# Patient Record
Sex: MALE | Race: BLACK OR AFRICAN AMERICAN | NOT HISPANIC OR LATINO | Employment: FULL TIME | URBAN - METROPOLITAN AREA
[De-identification: names, ages, dates, MRNs, and addresses within clinical notes are randomized per-mention and may not be internally consistent; named-entity substitution may affect disease eponyms.]

---

## 2022-07-22 ENCOUNTER — OCCUPATIONAL HEALTH (OUTPATIENT)
Dept: URGENT CARE | Facility: CLINIC | Age: 57
End: 2022-07-22

## 2022-07-22 DIAGNOSIS — Z00.00 ENCOUNTER FOR PHYSICAL EXAMINATION: ICD-10-CM

## 2022-07-22 DIAGNOSIS — F17.200 NEEDS SMOKING CESSATION EDUCATION: ICD-10-CM

## 2022-07-22 DIAGNOSIS — Z02.83 ENCOUNTER FOR DRUG SCREENING: ICD-10-CM

## 2022-07-22 LAB
CTP QC/QA: YES
POC 5 PANEL DRUG SCREEN: NEGATIVE

## 2022-07-22 PROCEDURE — 94010 PULMONARY FUNCTION SCREENING (OCC MED PHYSICALS): ICD-10-PCS | Mod: S$GLB,,, | Performed by: EMERGENCY MEDICINE

## 2022-07-22 PROCEDURE — 99080 OSHA QUESTIONNAIRE: ICD-10-PCS | Mod: S$GLB,,, | Performed by: EMERGENCY MEDICINE

## 2022-07-22 PROCEDURE — 80305 POCT RAPID DRUG SCREEN 5 PANEL: ICD-10-PCS | Mod: S$GLB,,, | Performed by: EMERGENCY MEDICINE

## 2022-07-22 PROCEDURE — 99499 UNLISTED E&M SERVICE: CPT | Mod: S$GLB,,, | Performed by: EMERGENCY MEDICINE

## 2022-07-22 PROCEDURE — 99002 MASK FIT-QUALITATIVE: ICD-10-PCS | Mod: S$GLB,,, | Performed by: EMERGENCY MEDICINE

## 2022-07-22 PROCEDURE — 92552 PURE TONE AUDIOMETRY AIR: CPT | Mod: S$GLB,,, | Performed by: EMERGENCY MEDICINE

## 2022-07-22 PROCEDURE — 94010 BREATHING CAPACITY TEST: CPT | Mod: S$GLB,,, | Performed by: EMERGENCY MEDICINE

## 2022-07-22 PROCEDURE — 99002 DEVICE HANDLING PHYS/QHP: CPT | Mod: S$GLB,,, | Performed by: EMERGENCY MEDICINE

## 2022-07-22 PROCEDURE — 99499 PHYSICAL, BASIC COMPLEXITY: ICD-10-PCS | Mod: S$GLB,,, | Performed by: EMERGENCY MEDICINE

## 2022-07-22 PROCEDURE — 99080 SPECIAL REPORTS OR FORMS: CPT | Mod: S$GLB,,, | Performed by: EMERGENCY MEDICINE

## 2022-07-22 PROCEDURE — 71045 X-RAY EXAM CHEST 1 VIEW: CPT | Mod: TIER,S$GLB,, | Performed by: RADIOLOGY

## 2022-07-22 PROCEDURE — 92552 AUDIOGRAM OCC MED: ICD-10-PCS | Mod: S$GLB,,, | Performed by: EMERGENCY MEDICINE

## 2022-07-22 PROCEDURE — 80305 DRUG TEST PRSMV DIR OPT OBS: CPT | Mod: S$GLB,,, | Performed by: EMERGENCY MEDICINE

## 2022-07-22 PROCEDURE — 71045 XR CHEST 1 VIEW: ICD-10-PCS | Mod: TIER,S$GLB,, | Performed by: RADIOLOGY

## 2022-07-22 NOTE — PROGRESS NOTES
[facility]    X-Ray Chest 1 View    Result Date: 7/22/2022  EXAMINATION: XR CHEST 1 VIEW CLINICAL HISTORY: Encounter for general adult medical examination without abnormal findings TECHNIQUE: Single frontal view of the chest was performed. COMPARISON: None FINDINGS: Heart size pulmonary vessels are normal.  Lungs are well aerated and clear.  No confluent consolidation.     No significant cardiopulmonary abnormality identified. Electronically signed by: Giovany Jenkins MD Date:    07/22/2022 Time:    11:43

## 2022-08-17 ENCOUNTER — TELEPHONE (OUTPATIENT)
Dept: SMOKING CESSATION | Facility: CLINIC | Age: 57
End: 2022-08-17
Payer: COMMERCIAL

## 2022-08-17 NOTE — TELEPHONE ENCOUNTER
Smoking Cessation counselor contacted patient regarding scheduled 4:00 P.M. visit, but patient has not arrived at clinic, nor did he answer the phone.  Counselor left a message with rescheduling information.      Jesica Choi RRT, MSW, LMSW, TTS  [phone]

## 2022-08-30 ENCOUNTER — TELEPHONE (OUTPATIENT)
Dept: SMOKING CESSATION | Facility: CLINIC | Age: 57
End: 2022-08-30
Payer: COMMERCIAL

## 2022-08-30 NOTE — TELEPHONE ENCOUNTER
Smoking Cessation counselor attempted to contact patient regarding no show appointment for intake visit, but patient did not answer.  Counselor left a message with rescheduling information.     Jesica Choi RRT,MSW,LMSW,TTS  [phone]

## 2022-09-02 ENCOUNTER — TELEPHONE (OUTPATIENT)
Dept: SMOKING CESSATION | Facility: CLINIC | Age: 57
End: 2022-09-02
Payer: COMMERCIAL

## 2023-04-16 ENCOUNTER — HOSPITAL ENCOUNTER (EMERGENCY)
Facility: HOSPITAL | Age: 58
Discharge: HOME | End: 2023-04-16
Attending: EMERGENCY MEDICINE

## 2023-04-16 VITALS
OXYGEN SATURATION: 99 % | HEART RATE: 92 BPM | SYSTOLIC BLOOD PRESSURE: 139 MMHG | HEIGHT: 73 IN | DIASTOLIC BLOOD PRESSURE: 97 MMHG | BODY MASS INDEX: 22.53 KG/M2 | WEIGHT: 170 LBS | RESPIRATION RATE: 15 BRPM | TEMPERATURE: 99 F

## 2023-04-16 DIAGNOSIS — M79.604 ACUTE PAIN OF RIGHT LOWER EXTREMITY: Primary | ICD-10-CM

## 2023-04-16 DIAGNOSIS — R20.2 PARESTHESIA OF RIGHT LOWER EXTREMITY: ICD-10-CM

## 2023-04-16 PROCEDURE — 25000003 PHARM REV CODE 250: Performed by: PHYSICIAN ASSISTANT

## 2023-04-16 PROCEDURE — 99283 EMERGENCY DEPT VISIT LOW MDM: CPT

## 2023-04-16 RX ORDER — DICLOFENAC SODIUM 10 MG/G
2 GEL TOPICAL 3 TIMES DAILY
Qty: 100 G | Refills: 0 | Status: SHIPPED | OUTPATIENT
Start: 2023-04-16 | End: 2023-04-26

## 2023-04-16 RX ORDER — IBUPROFEN 600 MG/1
600 TABLET ORAL
Status: COMPLETED | OUTPATIENT
Start: 2023-04-16 | End: 2023-04-16

## 2023-04-16 RX ORDER — MELOXICAM 7.5 MG/1
7.5 TABLET ORAL DAILY
Qty: 12 TABLET | Refills: 0 | Status: SHIPPED | OUTPATIENT
Start: 2023-04-16

## 2023-04-16 RX ADMIN — IBUPROFEN 600 MG: 600 TABLET ORAL at 11:04

## 2023-04-16 NOTE — DISCHARGE INSTRUCTIONS
Thank you for coming to our Emergency Department today. It is important to remember that some problems or medical conditions are difficult to diagnose and may not be found or addressed during your Emergency Department visit.  These conditions often start with non-specific symptoms and can only be diagnosed on follow up visits with your primary care physician or specialist when the symptoms continue or change. Please remember that all medical conditions can change, and we cannot predict how you will be feeling tomorrow or the next day. Return to the ER with any questions/concerns, new/concerning symptoms, worsening or failure to improve.       Be sure to follow up with your primary care doctor and review all labs/imaging/tests that were performed during your ER visit with them. It is very common for us to identify non-emergent incidental findings which must be followed up with your primary care physician.  Some labs/imaging/tests may be outside of the normal range, and require non-emergent follow-up and/or further investigation/treatment/procedures/testing to help diagnose/exclude/prevent complications or other potentially serious medical conditions. Some abnormalities may not have been discussed or addressed during your ER visit.     An ER visit does not replace a primary care visit, and many screening tests or follow-up tests cannot be ordered by an ER doctor or performed by the ER. Some tests may even require pre-approval.    If you do not have a primary care doctor, you may contact the one listed on your discharge paperwork or you may also call the [facility] Appointment Desk at [phone] , or [address] [place] [address] at  [phone] to schedule an appointment, or establish care with a primary care doctor or even a specialist and to obtain information about local resources. It is important to your health that you have a primary care doctor.    Please take all medications as directed. We have done our best to select  a medication for you that will treat your condition however, all medications may potentially have side-effects and it is impossible to predict which medications may give you side-effects or what those side-effects (if any) those medications may give you.  If you feel that you are having a negative effect or side-effect of any medication you should stop taking those medications immediately and seek medical attention. If you feel that you are having a life-threatening reaction call 911.        Do not drive, swim, climb to height, take a bath, operate heavy machinery, drink alcohol or take potentially sedating medications, sign any legal documents or make any important decisions for 24 hours if you have received any pain medications, sedatives or mood altering drugs during your ER visit or within 24 hours of taking them if they have been prescribed to you.     You can find additional resources for Dentists, hearing aids, durable medical equipment, low cost pharmacies and other resources at [email or web address]

## 2023-04-16 NOTE — Clinical Note
"Aquiles"Brice Hassan was seen and treated in our emergency department on 4/16/2023.  He may return to work on 04/19/2023.       If you have any questions or concerns, please don't hesitate to call.      Mariano Cotton PA-C"

## 2023-04-16 NOTE — ED PROVIDER NOTES
"Encounter Date: 4/16/2023    SCRIBE #1 NOTE: I, CAPRICE Abrams, am scribing for, and in the presence of,  Mariano Cotton PA-C. I have scribed the following portions of the note - Other sections scribed: HPI, ROS.     History     Chief Complaint   Patient presents with    Leg Pain     Pt to ED with complaints of R leg pain since January. Denies trauma to the leg. Pt states "Im 57 I think my age is catching up to me." Ambulatory in triage.     Aquiles Hassan is a 57 y.o. male, with no pertinent PMHx, who presents to the ED with right leg pain localized to right side between shin and ankle which began ~3 months ago. Pt also complains of intermittent numbness to right great toe. Pt reports he stands/walks ~10 hours for work regularly. Pt attempted treatment with Excedrin Extra Strength to temporary relief. No other exacerbating or alleviating factors. Denies leg swelling, knee pain, back pain, or other associated symptoms. Pt reports he had back surgery in 2000 but is unsure if it is related to recent leg pain.    The history is provided by the patient. No  was used.   Review of patient's allergies indicates:  No Known Allergies  History reviewed. No pertinent past medical history.  History reviewed. No pertinent surgical history.  History reviewed. No pertinent family history.  Social History     Tobacco Use    Smoking status: Every Day     Packs/day: 1.00     Types: Cigarettes    Smokeless tobacco: Never   Substance Use Topics    Alcohol use: Yes     Alcohol/week: 2.0 standard drinks     Types: 2 Cans of beer per week     Review of Systems   Constitutional:  Negative for fever.   HENT:  Negative for congestion, sore throat and trouble swallowing.    Respiratory:  Negative for cough and shortness of breath.    Cardiovascular:  Negative for chest pain and leg swelling.   Gastrointestinal:  Negative for abdominal pain, constipation, diarrhea, nausea and vomiting.   Genitourinary:  Negative for " dysuria, flank pain, frequency and urgency.   Musculoskeletal:  Positive for arthralgias and myalgias. Negative for back pain and joint swelling.   Skin:  Negative for rash.   Neurological:  Positive for numbness. Negative for headaches.   All other systems reviewed and are negative.    Physical Exam     Initial Vitals [04/16/23 1104]   BP Pulse Resp Temp SpO2   (!) 139/97 92 15 98.5 °F (36.9 °C) 99 %      MAP       --         Physical Exam    Nursing note and vitals reviewed.  Constitutional: He appears well-developed and well-nourished. He is not diaphoretic. No distress.   HENT:   Head: Atraumatic.   Right Ear: External ear normal.   Left Ear: External ear normal.   Mouth/Throat: Oropharynx is clear and moist. No oropharyngeal exudate.   Eyes: Conjunctivae are normal.   Neck: No tracheal deviation present.   Normal range of motion.  Cardiovascular:  Normal rate and regular rhythm.           Pulmonary/Chest: Effort normal. No accessory muscle usage or stridor. No tachypnea. No respiratory distress.   Musculoskeletal:         General: No tenderness or edema. Normal range of motion.      Cervical back: Normal range of motion.     Neurological: He displays no tremor. He displays no seizure activity. Coordination and gait normal.   Skin: Skin is warm and intact. No cyanosis.       ED Course   Procedures  Labs Reviewed - No data to display       Imaging Results              X-Ray Tibia Fibula 2 View Right (Final result)  Result time 04/16/23 12:02:14      Final result by Emily Eid MD (04/16/23 12:02:14)                   Impression:      No fracture.      Electronically signed by: Emily Eid  Date:    04/16/2023  Time:    12:02               Narrative:    EXAMINATION:  XR TIBIA FIBULA 2 VIEW RIGHT    CLINICAL HISTORY:  Pain in right leg    TECHNIQUE:  AP and lateral views of the right tibia and fibula were performed.    COMPARISON:  None.    FINDINGS:  No fracture.  No soft tissue abnormality.  The knee  and ankle joints, though incompletely evaluated, appear unremarkable.                                       Medications   ibuprofen tablet 600 mg (600 mg Oral Given 4/16/23 1124)     Medical Decision Making:   History:   Old Medical Records: I decided to obtain old medical records.  ED Management:  Chronic atraumatic pain to the distal right lower lateral extremity.  Screening x-ray shows no pathologic fracture or bony neoplastic process.  Perhaps history of lumbar surgery could be contributing to his symptoms.  He does have chronic paresthesia over the L5 dermatome.  He does appear to have some involvement of the S4 dermatome as well.  No foot drop or motor deficit.  No signs of DVT.  Ambulatory.        Scribe Attestation:   Scribe #1: I performed the above scribed service and the documentation accurately describes the services I performed. I attest to the accuracy of the note.                 I, Mariano Cotton PA-C, personally performed the services described in this documentation.  All medical record entries made by the scribe were at my direction and in my presence.  I have reviewed the chart and agree that the record reflects my personal performance and is accurate and complete.  Clinical Impression:   Final diagnoses:  [M79.604] Acute pain of right lower extremity (Primary)  [R20.2] Paresthesia of right lower extremity        ED Disposition Condition    Discharge Stable          ED Prescriptions       Medication Sig Dispense Start Date End Date Auth. Provider    meloxicam (MOBIC) 7.5 MG tablet Take 1 tablet (7.5 mg total) by mouth once daily. 12 tablet 4/16/2023 -- Mariano Cotton PA-C    diclofenac sodium (VOLTAREN) 1 % Gel Apply 2 g topically 3 (three) times daily. for 10 days 100 g 4/16/2023 4/26/2023 Mariano Cotton PA-C          Follow-up Information       Follow up With Specialties Details Why Contact Info    [facility]  Schedule an appointment as soon as possible for a visit in 1  day For reevaluation, AND to establish primary if you don't have a  [address]  [facility] [address]  [phone]      [facility] Orthopedics Schedule an appointment as soon as possible for a visit in 1 day For further evaluation of your orthopedic injury [address]  [facility] [address]  [phone]    [facility] [place] [facility] Emergency Medicine Go to  If symptoms worsen [address] [facility] [id number] [phone]             Mariano Cotton PA-C  04/16/23 [id number]

## 2023-06-02 ENCOUNTER — OCCUPATIONAL HEALTH (OUTPATIENT)
Dept: URGENT CARE | Facility: CLINIC | Age: 58
End: 2023-06-02

## 2023-06-02 DIAGNOSIS — Z02.1 PRE-EMPLOYMENT EXAMINATION: Primary | ICD-10-CM

## 2023-06-02 DIAGNOSIS — Z13.9 ENCOUNTER FOR SCREENING: Primary | ICD-10-CM

## 2023-06-02 DIAGNOSIS — F17.200 NEEDS SMOKING CESSATION EDUCATION: ICD-10-CM

## 2023-06-02 LAB
BREATH ALCOHOL: 0
CTP QC/QA: YES
POC 10 PANEL DRUG SCREEN: NEGATIVE

## 2023-06-02 PROCEDURE — 80305 OOH COLLECTION ONLY DRUG SCREEN: ICD-10-PCS | Mod: S$GLB,,, | Performed by: NURSE PRACTITIONER

## 2023-06-02 PROCEDURE — 82075 POCT BREATH ALCOHOL TEST: ICD-10-PCS | Mod: S$GLB,,, | Performed by: NURSE PRACTITIONER

## 2023-06-02 PROCEDURE — 99002 MASK FIT-QUALITATIVE: ICD-10-PCS | Mod: S$GLB,,, | Performed by: NURSE PRACTITIONER

## 2023-06-02 PROCEDURE — 99002 DEVICE HANDLING PHYS/QHP: CPT | Mod: S$GLB,,, | Performed by: NURSE PRACTITIONER

## 2023-06-02 PROCEDURE — 99499 PHYSICAL, BASIC COMPLEXITY: ICD-10-PCS | Mod: S$GLB,,, | Performed by: NURSE PRACTITIONER

## 2023-06-02 PROCEDURE — 80305 DRUG TEST PRSMV DIR OPT OBS: CPT | Mod: S$GLB,,, | Performed by: NURSE PRACTITIONER

## 2023-06-02 PROCEDURE — 94010 PULMONARY FUNCTION SCREENING (OCC MED PHYSICALS): ICD-10-PCS | Mod: S$GLB,,, | Performed by: NURSE PRACTITIONER

## 2023-06-02 PROCEDURE — 82075 ASSAY OF BREATH ETHANOL: CPT | Mod: S$GLB,,, | Performed by: NURSE PRACTITIONER

## 2023-06-02 PROCEDURE — 99080 OSHA QUESTIONNAIRE: ICD-10-PCS | Mod: S$GLB,,, | Performed by: NURSE PRACTITIONER

## 2023-06-02 PROCEDURE — 94010 BREATHING CAPACITY TEST: CPT | Mod: S$GLB,,, | Performed by: NURSE PRACTITIONER

## 2023-06-02 PROCEDURE — 99499 UNLISTED E&M SERVICE: CPT | Mod: S$GLB,,, | Performed by: NURSE PRACTITIONER

## 2023-06-02 PROCEDURE — 80305 POCT RAPID DRUG SCREEN 10 PANEL: ICD-10-PCS | Mod: S$GLB,,, | Performed by: NURSE PRACTITIONER

## 2023-06-02 PROCEDURE — 99080 SPECIAL REPORTS OR FORMS: CPT | Mod: S$GLB,,, | Performed by: NURSE PRACTITIONER

## 2023-06-14 ENCOUNTER — CLINICAL SUPPORT (OUTPATIENT)
Dept: SMOKING CESSATION | Facility: CLINIC | Age: 58
End: 2023-06-14

## 2023-06-14 DIAGNOSIS — F17.200 NICOTINE DEPENDENCE: Primary | ICD-10-CM

## 2023-06-14 PROCEDURE — 99407 BEHAV CHNG SMOKING > 10 MIN: CPT | Mod: S$GLB,,,

## 2023-06-14 PROCEDURE — 99999 PR PBB SHADOW E&M-EST. PATIENT-LVL I: ICD-10-PCS | Mod: PBBFAC,,,

## 2023-06-14 PROCEDURE — 99407 PR TOBACCO USE CESSATION INTENSIVE >10 MINUTES: ICD-10-PCS | Mod: S$GLB,,,

## 2023-06-14 PROCEDURE — 99999 PR PBB SHADOW E&M-EST. PATIENT-LVL I: CPT | Mod: PBBFAC,,,

## 2025-01-27 NOTE — TELEPHONE ENCOUNTER
January 27, 2025  ENA Tijerina  [address]  [address] [place]  [facility] [place] [address]    Dear PIPER Zayas,    The attached plan of care is being sent to you for review and reference.    You may indicate your approval by signing the document electronically, or by faxing/mailing a signed copy of the final page of this document back to the attention of Manjit Nielsen, PT:         Plan of Care 1/27/25   Effective from: 1/27/2025  Effective to: 2/28/2025    Plan ID: [id number]            Participants as of Finalize on 1/27/2025    Name Type Comments Contact Info    ENA Tijerina Referring Provider  [phone]    Manjit Nielsen, PT Physical Therapist         Last Plan Note     Author: Manjit Nielsen PT Status: Incomplete Last edited: 1/27/2025 10:15 AM         Outpatient Rehab    Physical Therapy Evaluation (only)    Patient Name: PIPER Zayas  MRN: [id number]  YOB: 1974  Today's Date: 1/27/2025    Therapy Diagnosis:   Encounter Diagnoses   Name Primary?    Lumbosacral spondylosis without myelopathy     Weakness of both lower extremities Yes    Impaired functional mobility, balance, gait, and endurance      Physician: Page Ahuja*    Physician Orders: Eval and Treat  Medical Diagnosis: M47.817 (ICD-10-CM) - Lumbosacral spondylosis without myelopathy    Visit # / Visits Authorized:  1 / Only initial evaluation approved by [employer]. Subsequent visits require prior authorization.     Date of Evaluation:  1/27/2025   Insurance Authorization Period: initial evaluation approved by phan  Plan of Care Certification:  1/27/2025 to 2/28/24      Time In: 1039   Time Out: 1107  Total Time: 28   Total Billable Time: 28         Subjective  History of Present Illness  PIPER is a 51 y.o. female who reports to physical therapy with a chief concern of Lumbosacral Spondylosis. According to the patient's chart, [email or web address]  Smoking Cessation counselor attempted to contact patient regarding no show appointment for intake visit, but patient did not answer.  Counselor left a message with rescheduling information.      Jesica Choi RRT,MSW,LMSW,TTS  [phone]   has no past surgical history on file.    The patient reports a medical diagnosis of M47.817 (ICD-10-CM) - Lumbosacral spondylosis without myelopathy. The patient has experienced this issue since 01/27/25.   Diagnostic tests related to this condition: X-ray.   X-Ray Details: Findings:  The vertebral body heights, alignment, disc spaces are preserved. Mild diffuse facet degeneration throughout the lumbar spine with sclerosis and osteophyte formation. Received From: [facility]    Dominant Hand: Right  History of Present Condition/Illness: Patient states her back has been hurting about 3 months ago. She states she just started hurting in her back one day and couldn't get out of bed about 3 days. She then went to the ER and got a shot and then she could move a little bit. She states that's when they took the imaging of her back. She states she has also had problems with her knees and feet in the past. She did therapy on the knees and states it helped, but her knees are just worn out. She states when she fell and broke both feet this probably did hurt the back some but she didn't pay that any attention because her feet were hurt so bad.     Activities of Daily Living  Social history was obtained from Patient.    General Prior Level of Function Comments: ind  General Current Level of Function Comments: ind       Previously independent with activities of daily living? Yes     Currently independent with activities of daily living? Yes              Pain     Patient reports a current pain level of 7/10. Pain at best is reported as 5/10. Pain at worst is reported as 9/10.   Location: middle lower back all the way down to the feet.  Clinical Progression (since onset): Worsening  Pain Qualities: Aching, Dull  Pain-Relieving Factors: Relaxation, Medications - prescription  Pain-Aggravating Factors: Standing, Walking, Holding objects, Bending         Review of Systems  Patient reports: Cardiac History and  Osteoarthritis        Living Arrangements  Living Situation  Housing: Home independently  Living Arrangements: Alone  Support Systems: Children    Home Setup  Type of Structure: House  Home Access: Level entry  Number of Levels in Home: One level  Patient is able to live on main floor of home: Yes        Employment  Patient reports: Does the patient's condition impact their ability to work?  Employment Status: Not employed          Past Medical History/Physical Systems Review:   Sherin Zayas  has a past medical history of Knee pain and Mixed hyperlipidemia.    Sherin Zayas  has no past surgical history on file.    Sherin has a current medication list which includes the following prescription(s): atorvastatin, fluoxetine, hydrocodone-acetaminophen, and hydroxychloroquine.    Review of patient's allergies indicates:  No Known Allergies     Objective  Posture  Patient presents with a Forward head position. Increased lumbar lordosis is observed.   Shoulders are Rounded. Pelvic tilt observed: Anterior              Knee Observations  Right Knee Observations  Not Present: Straight Leg Raise Extensor Lag  Left Knee Observations  Not Present: Straight Leg Raise Extensor Lag            Lower Extremity Sensation  General Lumbar/Lower Extremity Sensation  Intact: Right and Left  Right Lumbar/Lower Extremity Sensation  Intact: Light Touch, Sharp/Dull Discrimination, Static Two Point Discrimination, Dynamic Two Point Discrimination, Kinesthesia, and Proprioception  Right Lumbar/Lower Extremity Sensation Stocking Glove Pattern: No    Left Lumbar/Lower Extremity Sensation  Intact: Light Touch, Static Two Point Discrimination, Dynamic Two Point Discrimination, Sharp/Dull Discrimination, Kinesthesia, and Proprioception  Left Lumbar/Lower Extremity Sensation Stocking Glove Pattern: No             Right Lower Extremity Reflexes  Patellar, L4: Normal (2+)                   Left Lower Extremity Reflexes  Patellar, L4: Normal (2+)                         Spinal Mobility  Hypomobile: Lumbosacral       Spinal Muscle Palpation  Right Spinal Muscle Palpation  Abnormal: Lumbar/Sacral  Right Lumbar/Sacral Muscle Palpation Observations: ttp paraspinals, piriformis       Left Spinal Muscle Palpation  Abnormal: Lumbar/Sacral  Left Lumbar/Sacral Muscle Palpation Observations: ttp paraspinals, piriformis       Hip Palpation  Right Hip Palpation  Abnormal: Hip Muscle and Lumbar/Sacral Muscle  Right Hip Muscle Palpation Observations: piriformis  Right Lumbar/Sacral Muscle Palpation Observations: ttp paraspinals, piriformis       Left Hip Palpation  Abnormal: Hip Muscle and Lumbar/Sacral Muscle  Left Hip Muscle Palpation Observations: piriformis  Left Lumbar/Sacral Muscle Palpation Observations: ttp paraspinals, piriformis             Hip Range of Motion   Right Hip   Active (deg) Passive (deg) Pain   Flexion 100       Extension         ABduction 15       ADduction         External Rotation 90/90         External Rotation Prone         Internal Rotation 90/90         Internal Rotation Prone             Left Hip   Active (deg) Passive (deg) Pain   Flexion 100       Extension         ABduction 15       ADduction         External Rotation 90/90         External Rotation Prone         Internal Rotation 90/90         Internal Rotation Prone                  Knee Range of Motion   Right Knee   Active (deg) Passive (deg) Pain   Flexion 120       Extension 0           Left Knee   Active (deg) Passive (deg) Pain   Flexion 123       Extension 0                Ankle/Foot Range of Motion   Right Ankle/Foot   Active (deg) Passive (deg) Pain   Dorsiflexion (KE) 0       Dorsiflexion (KF)         Plantar Flexion   74     Ankle Inversion         Ankle Eversion         Subtalar Inversion         Subtalar Eversion         Great Toe MTP Flexion         Great Toe MTP Extension         Great Toe IP Flexion             Left Ankle/Foot   Active (deg) Passive (deg) Pain   Dorsiflexion  (KE) -6       Dorsiflexion (KF)         Plantar Flexion 68       Ankle Inversion         Ankle Eversion         Subtalar Inversion         Subtalar Eversion         Great Toe MTP Flexion         Great Toe MTP Extension         Great Toe IP Flexion                            Hip Strength - Planes of Motion   Right Strength Right Pain Left Strength Left  Pain   Flexion (L2) 4   4     Extension 3+   3+     ABduction 4   4     ADduction 4   4     Internal Rotation           External Rotation               Knee Strength   Right Strength Right Pain Left Strength Left  Pain   Flexion (S2) 4   4     Prone Flexion 4   4     Extension (L3) 4   4       Knee Extensor Lag  No Lag: Right and Left       Ankle/Foot Strength - Planes of Motion   Right Strength Right Pain Left Strength Left  Pain   Dorsiflexion (L4) 4+   4+     Plantar Flexion (S1) 4+   4+     Inversion 4+   4+     Eversion 4+   4+     Great Toe Flexion 4+   4+     Great Toe Extension (L5) 4+   4+     Lesser Toes Flexion 4+   4+     Lesser Toes Extension 4+   4+            Hip Special Tests  Nabil Test  Positive: Right     Right Knee Straight Leg Raise Extensor Lag: No  Left Knee Straight Leg Raise Extensor Lag: No    Slump test= Positive bilaterally    Knee Special Tests                  Gait Analysis  Base of Support: Wide  Gait Pattern: Antalgic  Walking Speed: Decreased    Right Side Walking Observations  Decreased: Step Length and Arm Swing  Right Foot Contact Pattern: Heel to toe    Left Side Walking Observations  Decreased: Step Length and Arm Swing  Left Foot Contact Pattern: Heel to toe  Trunk Observations During Gait: Right lateral lean over stance limb and Left lateral lean over stance limb    Knee Observations During Gait  Left: Decreased Knee Extension in Initial Contact  Ankle/Foot Observations During Gait  Bilateral: Pronated Foot         Intake Outcome Measure for FOTO Survey    Therapist reviewed HERB scores for PIPER Zayas on 1/27/2025.   HERB  report - see Media section or FOTO account episode details.     Intake Score: 48%    Patient's spiritual, cultural, and educational needs considered and patient agreeable to plan of care and goals.     Assessment & Plan  Assessment  PIPER presents with a condition of Low complexity.   Presentation of Symptoms: Stable  Will Comorbidities Impact Care: Yes  Hx of TIA    Functional Limitations: Activity tolerance, Ambulating on uneven surfaces, Carrying objects, Completing self-care activities, Decreased ambulation distance/endurance, Driving, Gait limitations, Pain with ADLs/IADLs, Performing household chores, Range of motion, Sitting tolerance, Standing tolerance, Squatting  Impairments: Abnormal gait, Activity intolerance, Impaired physical strength, Lack of appropriate home exercise program, Pain with functional activity  Personal Factors Affecting Prognosis: Pain, Motivation    Patient Goal for Therapy (PT): Would like to go and be able to socialize, stand up for longer times.  Prognosis: Fair  Assessment Details: Patient is a 50 y/o female presenting with lumbar pain due to spondylosis with radiculopathy down posterior bilateral legs. She demonstrated decreased lumbar mobility and range of motion, decreased lower extremity functional range of motion, decreased strength, pain and radiculopathy. Will address these deficits with appropriate intervention as tolerated.     Plan  From a physical therapy perspective, the patient would benefit from: Skilled Rehab Services    Planned therapy interventions include: Therapeutic exercise, Therapeutic activities, Neuromuscular re-education, and Manual therapy.    Planned modalities to include: Cryotherapy (cold pack), Electrical stimulation - attended, Electrical stimulation - passive/unattended, Thermotherapy (hot pack), Vasopneumatic pump, and Ultrasound.        Visit Frequency: 2 times Per Week for 4 Weeks.       This plan was discussed with Patient and Therapy assistant.    Discussion participants: Agreed Upon Plan of Care           Clinical Information for Insurance Authorization     Dates of Services: 1/27/25 to 2/28/25  Discharge Plan: Patient will be discharged from skilled physical therapy treatment once all goals have been met, patient has plateaued, or physician/insurance requests discontinuation of care. Patient will be discharged with a home exercise program.   Type of therapy: Rehabilitative  ICD-10 Diagnosis Code(s): M47.817 (ICD-10-CM) - Lumbosacral spondylosis without myelopathy  Which side is symptomatic? Not applicable and/or symptoms are not localized  Surgical: No  Surgical procedure: N/A  Surgery date: N/A.  Presenting symptoms/diagnoses are unspecified in nature.  Presenting symptoms are radiating in nature.   The rehabilitation is not related to a diagnosis of cancer.  The rehabilitation is not related to a diagnosis of lymphedema.  Patient's clinical presentation is:  Moderate objective and functional deficits: consistent symptoms and/or symptoms that are intensified with activity with moderate loss of range of motion, strength, or ability to perform daily tasks  CPT Codes Requested:  97398 [therapeutic exercise], 65041 [neuromuscular re-education], 29765 [gait training], 86152 [manual therapy], 79994 [therapeutic activities], 71447 [attended electrical stimulation], 95894 [ultrasound], 62242 [unattended electrical stimulation], 33544 [vasopneumatic device], 96951 [needle insertion(s) without injection(s), 1-2 muscles], and 99647 [needle insertion(s) without injection(s), 3 or more muscles]     Goals:   Active       Activity Tolerance       Patient will demonstrate sitting and standing tolerance of 40 minutes in order to improve activity tolerance.        Start:  01/27/25    Expected End:  02/28/25               Functional outcome       Patient stated goal: Would like to go and be able to socialize, stand up for longer times.        Start:  01/27/25    Expected  End:  02/28/25            Patient will demonstrate independence in home program for support of progression       Start:  01/27/25    Expected End:  02/28/25               Pain       Patient will report pain of 3/10 demonstrating a reduction of overall pain       Start:  01/27/25    Expected End:  02/28/25               Range of Motion       Patient will achieve bilateral hip flexion  degrees       Start:  01/27/25    Expected End:  02/28/25            Patient will achieve bilateral knee ROM of  0-130 degrees        Start:  01/27/25    Expected End:  02/28/25               Strength       Patient will achieve bilateral hip flexion strength of 5/5       Start:  01/27/25    Expected End:  02/28/25            Patient will achieve bilateral knee flexion strength of 5/5       Start:  01/27/25    Expected End:  02/28/25            Patient will achieve bilateral knee extension strength of 5/5       Start:  01/27/25    Expected End:  02/28/25              Manjit Nielsen PT, DPT   01/27/2025          Current Participants as of 1/27/2025    Name Type Comments Contact Info    ENA Tijerina Referring Provider  [phone]    Signature pending    Manjit Nielsen PT Physical Therapist                  Sincerely,      Manjit Nielsen PT  [facility]                                                            Dear Manjit Nielsen PT,    RE: Ms. Sherin Zayas, MRN: [id number]    I certify that I have reviewed the attached plan of care and agree to the details within.        ___________________________  ___________________________  Patient Printed Name    Patient Signed Name      ___________________________  Date and Time